# Patient Record
Sex: MALE | Race: OTHER | NOT HISPANIC OR LATINO | URBAN - METROPOLITAN AREA
[De-identification: names, ages, dates, MRNs, and addresses within clinical notes are randomized per-mention and may not be internally consistent; named-entity substitution may affect disease eponyms.]

---

## 2023-06-14 ENCOUNTER — OUTPATIENT (OUTPATIENT)
Dept: OUTPATIENT SERVICES | Facility: HOSPITAL | Age: 28
LOS: 1 days | End: 2023-06-14
Payer: COMMERCIAL

## 2023-06-14 VITALS
DIASTOLIC BLOOD PRESSURE: 84 MMHG | OXYGEN SATURATION: 100 % | SYSTOLIC BLOOD PRESSURE: 132 MMHG | WEIGHT: 169.09 LBS | HEART RATE: 62 BPM | RESPIRATION RATE: 14 BRPM | TEMPERATURE: 98 F | HEIGHT: 67 IN

## 2023-06-14 DIAGNOSIS — S86.091A OTHER SPECIFIED INJURY OF RIGHT ACHILLES TENDON, INITIAL ENCOUNTER: ICD-10-CM

## 2023-06-14 DIAGNOSIS — Z01.818 ENCOUNTER FOR OTHER PREPROCEDURAL EXAMINATION: ICD-10-CM

## 2023-06-14 LAB
ANION GAP SERPL CALC-SCNC: 3 MMOL/L — LOW (ref 5–17)
BUN SERPL-MCNC: 13 MG/DL — SIGNIFICANT CHANGE UP (ref 7–23)
CALCIUM SERPL-MCNC: 9.5 MG/DL — SIGNIFICANT CHANGE UP (ref 8.5–10.1)
CHLORIDE SERPL-SCNC: 104 MMOL/L — SIGNIFICANT CHANGE UP (ref 96–108)
CO2 SERPL-SCNC: 31 MMOL/L — SIGNIFICANT CHANGE UP (ref 22–31)
CREAT SERPL-MCNC: 1.1 MG/DL — SIGNIFICANT CHANGE UP (ref 0.5–1.3)
EGFR: 93 ML/MIN/1.73M2 — SIGNIFICANT CHANGE UP
GLUCOSE SERPL-MCNC: 88 MG/DL — SIGNIFICANT CHANGE UP (ref 70–99)
HCT VFR BLD CALC: 44.4 % — SIGNIFICANT CHANGE UP (ref 39–50)
HGB BLD-MCNC: 15.1 G/DL — SIGNIFICANT CHANGE UP (ref 13–17)
MCHC RBC-ENTMCNC: 29 PG — SIGNIFICANT CHANGE UP (ref 27–34)
MCHC RBC-ENTMCNC: 34 GM/DL — SIGNIFICANT CHANGE UP (ref 32–36)
MCV RBC AUTO: 85.4 FL — SIGNIFICANT CHANGE UP (ref 80–100)
NRBC # BLD: 0 /100 WBCS — SIGNIFICANT CHANGE UP (ref 0–0)
PLATELET # BLD AUTO: 194 K/UL — SIGNIFICANT CHANGE UP (ref 150–400)
POTASSIUM SERPL-MCNC: 4.2 MMOL/L — SIGNIFICANT CHANGE UP (ref 3.5–5.3)
POTASSIUM SERPL-SCNC: 4.2 MMOL/L — SIGNIFICANT CHANGE UP (ref 3.5–5.3)
RBC # BLD: 5.2 M/UL — SIGNIFICANT CHANGE UP (ref 4.2–5.8)
RBC # FLD: 12.9 % — SIGNIFICANT CHANGE UP (ref 10.3–14.5)
SODIUM SERPL-SCNC: 138 MMOL/L — SIGNIFICANT CHANGE UP (ref 135–145)
WBC # BLD: 7.34 K/UL — SIGNIFICANT CHANGE UP (ref 3.8–10.5)
WBC # FLD AUTO: 7.34 K/UL — SIGNIFICANT CHANGE UP (ref 3.8–10.5)

## 2023-06-14 PROCEDURE — 85027 COMPLETE CBC AUTOMATED: CPT

## 2023-06-14 PROCEDURE — 36415 COLL VENOUS BLD VENIPUNCTURE: CPT

## 2023-06-14 PROCEDURE — 80048 BASIC METABOLIC PNL TOTAL CA: CPT

## 2023-06-14 PROCEDURE — G0463: CPT

## 2023-06-14 NOTE — H&P PST ADULT - GENERAL COMMENTS
Denies any travel in the last 2 weeks - denies any recent or known exposure to anyone with covid -  denies any current covid symptoms

## 2023-06-14 NOTE — H&P PST ADULT - HISTORY OF PRESENT ILLNESS
28 year old male no significant PMH; now with Other Injury Of Right Achilles Tendon; presents today for PST prior to Repair RIGHT Ruptured Achilles Tendon with Dr Oscar Beth on 6/20/23.     Pt notes back on June 4th  he was playing [employer] with his family and notes he was  "jumping around like a monkey and suddenly I felt a pop and I felt like someone had pushed me." Pt notes the following day he went to Urgent Care and had an xray which was negative for any break.  Following exam he was sent for ortho consult with Dr Beth. Pt was then sent for an MRI which showed Achilles Tendon Rupture. Pt was also fitted for Immobilizer boot and has been also ambulating with a cane for assistance. Pt denies any pain today while here in PST however if he has the boot off or steps flat on his foot he will feel pain which he will then rate #7/10 on pain scale. Denies use of any pain medication at this time. Following discussions  with Dr Beth regarding treatment options (Full cast vs repair in the O.R>) pt is electing for scheduled procedure.

## 2023-06-14 NOTE — H&P PST ADULT - PROBLEM SELECTOR PLAN 1
PST labs; CBC, BMP. No medical clearance needed as per Dr Beth.  Pt instructed to stop and NSAIDS/Herbal Supplements between now and procedure. May take Tylenol if needed for any pain between now and procedure. No medications needed morning of procedure. Pre-op instructions as well as pre-op wash instructions given to pt with understanding verbalized. All questions addressed with pt prior to him leaving the PST department today.

## 2023-06-14 NOTE — H&P PST ADULT - MUSCULOSKELETAL COMMENTS
RIGHT Foot in Immobilizer Boot  secondary to RIGHT Achilles Tendon Rupture - pt ambulating with Cane Torn RIGHT Achilles Tendon - SEE HPI - Pt wearing Immobilizer Boot - ambulating with a cane RIGHT Foot in Immobilizer Boot  secondary to RIGHT Achilles Tendon Rupture - pt ambulating with Cane- slight ecchymosis noted to area

## 2023-06-14 NOTE — H&P PST ADULT - ASSESSMENT
28 year old male no significant PMH; now with Other Injury Of Right Achilles Tendon; scheduled for Repair RIGHT Ruptured Achilles Tendon with Dr Oscar Beth on 6/20/23.     Pt notes back on June 4th  he was playing [employer] with his family and notes he was  "jumping around like a monkey and suddenly I felt a pop and I felt like someone had pushed me." Pt notes the following day he went to Urgent Care and had an xray which was negative for any break.  Following exam he was sent for ortho consult with Dr Beth. Pt was then sent for an MRI which showed Achilles Tendon Rupture. Pt was also fitted for Immobilizer boot and has been also ambulating with a cane for assistance. Pt denies any pain today while here in PST however if he has the boot off or steps flat on his foot he will feel pain which he will then rate #7/10 on pain scale. Denies use of any pain medication at this time. Following discussions  with Dr Beth regarding treatment options (Full cast vs repair in the O.R>) pt is electing for scheduled procedure.

## 2023-06-14 NOTE — H&P PST ADULT - NEGATIVE GENERAL SYMPTOMS
Notes prior H/O COVID November 2022 - States he has received covid vaccine/no fever/no chills/no sweating

## 2023-06-14 NOTE — H&P PST ADULT - NSICDXFAMILYHX_GEN_ALL_CORE_FT
FAMILY HISTORY:  Father  Still living? Yes, Estimated age: 61-70  Family history of hypertension, Age at diagnosis: Age Unknown    Mother  Still living? Yes, Estimated age: 61-70  Family history of hypertension, Age at diagnosis: Age Unknown

## 2023-06-19 NOTE — ASU PATIENT PROFILE, ADULT - NSSUBSTANCEUSE_GEN_ALL_CORE_SD
[FreeTextEntry1] : patient her for follow up [de-identified] : ABNORMAL LAB FOLLOW UP\par EATING ICECREAM AND SHRIMP Denies Illicit Drug Use/caffeine

## 2023-06-19 NOTE — ASU PATIENT PROFILE, ADULT - CAREGIVER NAME
Phoned pt, per Dr. Dotty Aragon. Left VM for pt to call our office. Dr. Damon Law note from 9/6, states if xray was negative, no fu appt needed. Pt has appt with [facility] 9/11/18 for f/u. [facility] [phone] wife

## 2023-06-19 NOTE — ASU PATIENT PROFILE, ADULT - FALL HARM RISK - HARM RISK INTERVENTIONS

## 2023-06-20 ENCOUNTER — OUTPATIENT (OUTPATIENT)
Dept: OUTPATIENT SERVICES | Facility: HOSPITAL | Age: 28
LOS: 1 days | End: 2023-06-20
Payer: COMMERCIAL

## 2023-06-20 VITALS
HEART RATE: 69 BPM | RESPIRATION RATE: 16 BRPM | DIASTOLIC BLOOD PRESSURE: 70 MMHG | SYSTOLIC BLOOD PRESSURE: 119 MMHG | OXYGEN SATURATION: 99 %

## 2023-06-20 VITALS
SYSTOLIC BLOOD PRESSURE: 139 MMHG | DIASTOLIC BLOOD PRESSURE: 84 MMHG | OXYGEN SATURATION: 99 % | WEIGHT: 169.09 LBS | RESPIRATION RATE: 14 BRPM | TEMPERATURE: 99 F | HEIGHT: 67 IN | HEART RATE: 72 BPM

## 2023-06-20 DIAGNOSIS — S86.091A OTHER SPECIFIED INJURY OF RIGHT ACHILLES TENDON, INITIAL ENCOUNTER: ICD-10-CM

## 2023-06-20 DIAGNOSIS — Z01.818 ENCOUNTER FOR OTHER PREPROCEDURAL EXAMINATION: ICD-10-CM

## 2023-06-20 PROCEDURE — 27650 REPAIR ACHILLES TENDON: CPT | Mod: RT

## 2023-06-20 RX ORDER — ONDANSETRON 8 MG/1
4 TABLET, FILM COATED ORAL ONCE
Refills: 0 | Status: DISCONTINUED | OUTPATIENT
Start: 2023-06-20 | End: 2023-06-20

## 2023-06-20 RX ORDER — DOCUSATE SODIUM 100 MG
1 CAPSULE ORAL
Qty: 21 | Refills: 0
Start: 2023-06-20 | End: 2023-06-26

## 2023-06-20 RX ORDER — ASPIRIN/CALCIUM CARB/MAGNESIUM 324 MG
1 TABLET ORAL
Qty: 28 | Refills: 0
Start: 2023-06-20 | End: 2023-07-17

## 2023-06-20 RX ORDER — HYDROMORPHONE HYDROCHLORIDE 2 MG/ML
0.5 INJECTION INTRAMUSCULAR; INTRAVENOUS; SUBCUTANEOUS
Refills: 0 | Status: DISCONTINUED | OUTPATIENT
Start: 2023-06-20 | End: 2023-06-20

## 2023-06-20 RX ORDER — OXYCODONE HYDROCHLORIDE 5 MG/1
1 TABLET ORAL
Qty: 30 | Refills: 0
Start: 2023-06-20 | End: 2023-06-24

## 2023-06-20 RX ORDER — SODIUM CHLORIDE 9 MG/ML
1000 INJECTION, SOLUTION INTRAVENOUS
Refills: 0 | Status: DISCONTINUED | OUTPATIENT
Start: 2023-06-20 | End: 2023-06-20

## 2023-06-20 RX ORDER — CEFAZOLIN SODIUM 1 G
2000 VIAL (EA) INJECTION ONCE
Refills: 0 | Status: DISCONTINUED | OUTPATIENT
Start: 2023-06-20 | End: 2023-06-20

## 2023-06-20 RX ORDER — ONDANSETRON 8 MG/1
1 TABLET, FILM COATED ORAL
Qty: 28 | Refills: 0
Start: 2023-06-20 | End: 2023-06-26

## 2023-06-20 RX ORDER — OXYCODONE HYDROCHLORIDE 5 MG/1
5 TABLET ORAL ONCE
Refills: 0 | Status: DISCONTINUED | OUTPATIENT
Start: 2023-06-20 | End: 2023-06-20

## 2023-06-20 RX ADMIN — SODIUM CHLORIDE 75 MILLILITER(S): 9 INJECTION, SOLUTION INTRAVENOUS at 13:51

## 2023-06-20 NOTE — ASU DISCHARGE PLAN (ADULT/PEDIATRIC) - CARE PROVIDER_API CALL
Oscar Beth  Orthopaedic Surgery  [address] [place] [address]  Phone: [phone]  Fax: [phone]  Follow Up Time:

## 2023-06-20 NOTE — ASU DISCHARGE PLAN (ADULT/PEDIATRIC) - ASU DC SPECIAL INSTRUCTIONSFT
Achilles Repair Discharge Instructions:    1.	Pain medications sent to pharmacy  2.	Non-Weight Bearing Right Lower Extremity, in-splint with assistive device/rolling walker  3.	Continue DVT/PE Prophylaxis. EC ASpirin 325 x 4 weeks. See Med Rec.   4.	Ambulate plenty w/ crutches  5.	Follow up with Orthopedic Surgeon Dr. Beth in 10-14 Days after Discharge from the Hospital. Call Office For Appointment.  6.	Splint to be removed and incision inspected Post-Op Day 14  7.	Elevate the extremity as much as possible  8.	Keep bandage/Splint Clean and dry. Do not get it wet. Do not walk or put any body weight on splint because it will break. Achilles Repair Discharge Instructions:    1.	Pain medications sent to pharmacy  2.	Non-Weight Bearing Right Lower Extremity, in-splint with assistive device/rolling walker  3.	Continue DVT/PE Prophylaxis. Aspirin 325 x 4 weeks. See Med Rec.   4.	Ambulate plenty w/ crutches  5.	Follow up with Orthopedic Surgeon Dr. Beth in 10-14 Days after Discharge from the Hospital. Call Office For Appointment.  6.	Splint to be removed and incision inspected Post-Op Day 14  7.	Elevate the extremity as much as possible  8.	Keep bandage/Splint Clean and dry. Do not get it wet. Do not walk or put any body weight on splint because it will break.

## 2023-06-20 NOTE — ASU DISCHARGE PLAN (ADULT/PEDIATRIC) - NS MD DC FALL RISK RISK
For information on Fall & Injury Prevention, visit: [email or web address] OR  [email or web address] OR  https://www.cdc.gov/steadi/patient.html

## 2023-06-20 NOTE — BRIEF OPERATIVE NOTE - NSICDXBRIEFPREOP_GEN_ALL_CORE_FT
PRE-OP DIAGNOSIS:  Achilles tendon tear, right, initial encounter 20-Jun-2023 13:31:10  Kevin Serna

## (undated) DEVICE — PLV-SCD MACHINE: Type: DURABLE MEDICAL EQUIPMENT

## (undated) DEVICE — PACK LOWER EXTREMITY NS PLAINVI

## (undated) DEVICE — PLV/PSP-TOURNIQUET #10 402009190016: Type: DURABLE MEDICAL EQUIPMENT

## (undated) DEVICE — DRAPE TOWEL BLUE 17" X 24"

## (undated) DEVICE — SUT VICRYL 0 54" TIES

## (undated) DEVICE — VENODYNE/SCD SLEEVE CALF LARGE

## (undated) DEVICE — TOURNIQUET CUFF 18" DUAL PORT SINGLE BLADDER LUER LOCK (BLACK)

## (undated) DEVICE — SUT VICRYL 2-0 27" CP-2 UNDYED

## (undated) DEVICE — DRSG SCOTCH CAST TAPE 4"

## (undated) DEVICE — DRAPE 3/4 SHEET W REINFORCEMENT 56X77"

## (undated) DEVICE — SUT HEWSON RETRIEVER

## (undated) DEVICE — SUT NDL MAYO CATGUT 1/2 CIRCLE TAPER POINT 0.050" X 1.092"

## (undated) DEVICE — Device

## (undated) DEVICE — DRAPE C ARM MINI PACK FOR 6800

## (undated) DEVICE — GLV 8 PROTEXIS ORTHO (CREAM)

## (undated) DEVICE — SUT TICRON 5 30" KV-37

## (undated) DEVICE — VENODYNE/SCD SLEEVE CALF MEDIUM

## (undated) DEVICE — PLV-STRYKER SYSTEM 8: Type: DURABLE MEDICAL EQUIPMENT

## (undated) DEVICE — DRAPE SHOWER CURTAIN ISOLATION

## (undated) DEVICE — DRAPE U POLY BLUE 60"X60"

## (undated) DEVICE — DRSG CAST PLASTER XFAST 4"